# Patient Record
Sex: MALE | Race: WHITE
[De-identification: names, ages, dates, MRNs, and addresses within clinical notes are randomized per-mention and may not be internally consistent; named-entity substitution may affect disease eponyms.]

---

## 2018-04-09 ENCOUNTER — HOSPITAL ENCOUNTER (OUTPATIENT)
Dept: HOSPITAL 45 - C.MRI | Age: 53
Discharge: HOME | End: 2018-04-09
Attending: OTOLARYNGOLOGY
Payer: COMMERCIAL

## 2018-04-09 DIAGNOSIS — H90.5: Primary | ICD-10-CM

## 2018-04-09 NOTE — DIAGNOSTIC IMAGING REPORT
MRI OF THE BRAIN COMBO INTERNAL AUDITORY CANAL PROTOCOL



CLINICAL HISTORY: Left-sided hearing loss. Tinnitus. 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

involving the left ear drum.



COMPARISON STUDY: No priors.



TECHNIQUE: MRI of the brain was performed utilizing various T1 and T2-weighted

sequences in the axial, sagittal, and coronal planes. Contrast-enhanced

sequences were acquired following the administration of 14 cc of Gadavist.

Additional high-resolution imaging was performed through the skull base both pre

and post contrast to assess the internal auditory canals. The examination is

modestly degraded by motion artifact.



FINDINGS:



Brain parenchyma: The brain parenchyma is normal in appearance. There is no

hemorrhage or mass effect. There is no restricted diffusion to suggest acute

ischemia. No enhancing mass lesion is identified on the postcontrast images.

Gray-white matter differentiation is preserved. No extra-axial fluid collection

is seen. The cerebellar tonsils are normal in configuration.



Ventricles, sulci, and cisterns: Normal in configuration.



Internal auditory canals: No enhancing mass lesion is identified in the

cerebellopontine angle bilaterally. No mass lesion or abnormal enhancement is

identified along the course of the internal auditory canals. The middle ear

structures are normal as visualized. No abnormal enhancement is suggested along

the course of the external auditory canals.



Pituitary and sella: Unremarkable.



Intracranial vasculature: Normal flow voids are maintained at the skull base.



Orbits: The bony orbits are grossly intact. Orbital contents are normal in

appearance.



Sinuses and mastoids: Mild mucosal thickening is seen within the maxillary antra

and ethmoid sinuses. The remaining paranasal sinuses and the mastoid air cells

are clear.



Calvarium: Unremarkable.



Cervical cord: Partially visualized cervical spinal cord is normal in morphology

and signal intensity.





IMPRESSION: 



1. No acute intracranial abnormality.



2. Unremarkable MRI assessment of the internal auditory canals.



3. No enhancing lesion is suggested in the region of the left external auditory

canal/tympanic membrane.







Electronically signed by:  Christian Lowe M.D.

4/9/2018 9:47 PM



Dictated Date/Time:  4/9/2018 9:41 PM